# Patient Record
(demographics unavailable — no encounter records)

---

## 2025-02-26 NOTE — ASSESSMENT
[FreeTextEntry1] : On physical exam there is no significant increased tympany or tenderness.  I recommended that she continue lubiprostone 24 mcg p.o. twice daily and that she add MiraLAX 17 g in 8 ounces of water to be taken every other day.  It was recommended that she adhere to the low FODMAP diet daily.  In the meantime she will obtain a CBC, comprehensive metabolic profile, celiac antibodies as well as magnesium and B12 levels.  She will undergo a CAT scan of the abdomen and pelvis with the prednisone prep for further evaluation.  Current recommendations will depend upon the results of the above.

## 2025-02-26 NOTE — REASON FOR VISIT
[Follow-up] : a follow-up of an existing diagnosis [FreeTextEntry1] : Chronic constipation, gastroesophageal reflux without esophagitis, chronic abdominal bloating and prior adenomatous colon polyp

## 2025-02-26 NOTE — PHYSICAL EXAM
[Alert] : alert [Normal Voice/Communication] : normal voice/communication [Healthy Appearing] : healthy appearing [No Acute Distress] : no acute distress [Obese (BMI >= 30)] : obese (BMI >= 30) [Sclera] : the sclera and conjunctiva were normal [Hearing Threshold Finger Rub Not Darke] : hearing was normal [Normal Lips/Gums] : the lips and gums were normal [Oropharynx] : the oropharynx was normal [Normal Appearance] : the appearance of the neck was normal [No Respiratory Distress] : no respiratory distress [No Acc Muscle Use] : no accessory muscle use [Respiration, Rhythm And Depth] : normal respiratory rhythm and effort [Heart Rate And Rhythm] : heart rate was normal and rhythm regular [Bowel Sounds] : normal bowel sounds [Abdomen Tenderness] : non-tender [No Masses] : no abdominal mass palpated [Abdomen Soft] : soft [] : no hepatosplenomegaly [Oriented To Time, Place, And Person] : oriented to person, place, and time

## 2025-02-26 NOTE — HISTORY OF PRESENT ILLNESS
[FreeTextEntry1] : The patient has been previously followed by Dr. Camejo who performed upper endoscopies in 2012 and 2015 for evaluation of atypical chest pain and both were unremarkable. He also performed a colonoscopy in 2009 which was normal other than the presence of hemorrhoids. She has not been seen in our office since that time. She has been followed most recently by Dr. Basilio. She apparently has relatively severe chronic constipation with a great deal of gas, bloating and distention that radiates up into her chest as well as occasional heartburn. July 2023 she underwent a follow-up colonoscopy by him that revealed diverticuli in the sigmoid colon and a 5 mm polyp in the transverse colon that was removed by snare polypectomy and was a tubular adenoma. There were no other abnormalities. That same day he performed an upper endoscopy which was normal other than the presence of fundic gland polyps and a pyloric channel ulcer. Biopsies for celiac disease were negative and there were no H. pylori organisms. She has been taking pantoprazole 40 mg p.o. every morning ever since and only has occasional heartburn with no significant upper abdominal pain other than the bloating.  When initially seen by me in October of last year she was on no medication or supplements for the chronic constipation and was experiencing a bowel movement about once weekly for which she will take an enema.  A CAT scan of the abdomen and pelvis in March of last year revealed the absence of the gallbladder but was otherwise normal with the exception of severe atherosclerotic calcification of the coronary arteries and some perinephric soft tissue stranding due to previous inflammation or infection of her kidneys.  There was a 10 mm renal abnormality for which she underwent an abdominal sonogram.  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  There was also a subcentimeter echogenic lesion in each kidney for which a 6-month follow-up was recommended.  I ordered thyroid function test and a C-reactive protein all of which were normal.  She was also placed on a low FODMAP diet as well as lubiprostone 24 mcg p.o. twice daily.  She returns today for routine follow-up.  With the use of lubiprostone twice daily she is having a bowel movement every 3 to 4 days rather than once daily but for the past several days has been experiencing rather diffuse abdominal pain and increased bloating but there is no nausea or vomiting.  There is no rectal bleeding.  She does not regularly adhere to the low FODMAP diet.

## 2025-04-10 NOTE — HISTORY OF PRESENT ILLNESS
[FreeTextEntry1] : Patient with history of CAD, CM, CHF, hypertension. Patient underwent coronary angiogram in 2014 and noted to have patent stents.  Denies any exertional chest pain, shortness of breath or palpitations. Is able to do her ADLs.   9/2018- Had shortness of breath and chest pain. Underwent cardiac cath at [facility].  12/10/20 She presents today after c/o left sided chest pressure/tightness for 48 hours. Able to localize pressure over left chest, and also feels in back. Does not worsen with exertion, mild in nature. Denies nausea, vomiting, sob, fatigue, BERG. Not tender to palpation. Had NST 8/31/20 , no clear evidence of ischemia or infarct. EF 42%. Cath in 2018 revealed patent stents with 0% stenosis.Has GERD, has not been taking prilosec or gas-x.    4/2021- Stable. GERD symptoms. Had COVID in december.   7/19/2022 Patient here for follow up. [facility] She presented to ED with c/o headaches and double vision. MRI and CT scan of brain showed no acute findings. MRA neck showed 50-69% stenosis of right carotid bulb and hypoplasia of cervical right vertebral artery. She was also told she has weakened heart function (EF in the 30s) and leaky valve. She was started on coreg and entresto. Echo results not available (will [facility] from [facility]). She c/o fatigue and dyspnea with exertion x 6 months. Denies chest pain, palpitations, near syncope or syncope. Denies edema or orthopnea, sleeps with 2 pillows which is her baseline. She uses CPAP at night for ZACK. Does have acid reflux symptoms for years and relieved by gasX.   8/30/2022 Patient here for follow up. She is s/p [facility] which showed moderate RCA in-stent restenosis and patent LAD and LCX stents, normal LVEDP- no PCI needed. RFA site benign. TTE done on 8/24/22 showed LVEF 50-55%, trace MR and moderate AI (LVEF 40% with severe MR noted on [facility]TE in [facility] She denies chest pain, SOB at rest, palpitations, near syncope or syncope. She has fatigue and exertional dyspnea, unchanged. No edema or orthopnea. She reports she had covid in July.   7/11/2023 Patient returns for preprocedural cardiac risk assessment for upcoming upper endoscopy and colonoscopy to be performed by Dr. Karen Basilio on July 19. Denies chest pain, shortness of breath, palpitations, syncope or near syncope, orthopnea and PND. Compliant with medications. No leg edema.  2/2024- Underwent stress/echo/carotid US. Has had left sided chest pain with radiation to back pain, no N/v/d. Has had GERD.   6/3/2024-Pt was at [facility] yesterday 6/2 due to CP and an episode of SOB started on 5/27/24. CXR negative, HST negative x 3, pBNP 265, LE v doppler negative for DVT. Pt reports left sided chest pressure that comes and goes, with radiation to the left scapula started 5/27/2024, felt multiple times per day lasting 10-15 minutes at a time. In the past she has felt complete relief with rubbing the area, belching and taking GasX, now only partial relief. pain is not reproducible to palpitation. Reports 1 episode of SOB while doing extensive yard work last Wednesday no CP at that time. Compliant with meds. She has had some Left LE edema which resolves with leg elevation.  She has a lot of stress at home with her 15y/o grandson.    6/2024- Didn't get cath done. TTE. Endoscopy with gastric ulcer. Now on meds.   8/2024- No Chest pain symptoms. Underoing GI procedures.   4/4/2025 Returns for follow up office visit. Reports of feeling good. Gets dizzy at times when standing up fast. Appears euvolemic. Denies chest pain, shortness of breath, palpitations, syncope. Compliant with medications. No leg edema. Uses CPAP device regularly.

## 2025-04-10 NOTE — DISCUSSION/SUMMARY
[Patient] : the patient [Risks] : risks [Benefits] : benefits [Alternatives] : alternatives [FreeTextEntry1] : Dr. Ribeiro was present in the office during the visit  1. CAD- Patent stents on LHC done 8/24/22. Denies CP/SOB. NST last 1/2024 with normal myocardial perfusion scan, with no evidence of infarction or inducible ischemia. Echo last 6/2024 with EF 50-55%, G1DD, mild AI, mild TR. On aspirin 81 mg QD, atorvastatin 20 mg Q HS, Imdur 30 mg ER QD and BB 2. CM/CHF- appears euvolemic. TTE 6/2024 LVEF 50-55% continue Coreg, Entresto, and spironolactone,  3. Hypertension- stable, controlled 4. HLD- on Atorvastatin 20 mg QD, continue. Will request fasting lipid panel 5. DM- follows with PMD 6. Moderate AI- on TTE last 8/2022. Mild AI on echo last 6/2024 7. f/u with Dr. Ribeiro in 6 months or sooner if needed   Patient was advised to contact the office or seek emergency medical care for any new, worsening or concerning symptoms. Patient verbalized understanding and is in agreement with the above plan.  Anthony Magallanes, NP [EKG obtained to assist in diagnosis and management of assessed problem(s)] : EKG obtained to assist in diagnosis and management of assessed problem(s)

## 2025-04-10 NOTE — CARDIOLOGY SUMMARY
[de-identified] : 4/4/2025: Sinus Rhythm  -Left bundle branch block. 6/3/2024 Sinus 1degree AVB with LBBB

## 2025-04-10 NOTE — CARDIOLOGY SUMMARY
[de-identified] : 4/4/2025: Sinus Rhythm  -Left bundle branch block. 6/3/2024 Sinus 1degree AVB with LBBB

## 2025-04-10 NOTE — REVIEW OF SYSTEMS
[SOB] : no shortness of breath [Lower Ext Edema] : no extremity edema [Chest Discomfort] : no chest discomfort [Palpitations] : no palpitations [Syncope] : no syncope

## 2025-04-10 NOTE — HISTORY OF PRESENT ILLNESS
[FreeTextEntry1] : Patient with history of CAD, CM, CHF, hypertension. Patient underwent coronary angiogram in 2014 and noted to have patent stents.  Denies any exertional chest pain, shortness of breath or palpitations. Is able to do her ADLs.   9/2018- Had shortness of breath and chest pain. Underwent cardiac cath at [facility].  12/10/20 She presents today after c/o left sided chest pressure/tightness for 48 hours. Able to localize pressure over left chest, and also feels in back. Does not worsen with exertion, mild in nature. Denies nausea, vomiting, sob, fatigue, BERG. Not tender to palpation. Had NST 8/31/20 , no clear evidence of ischemia or infarct. EF 42%. Cath in 2018 revealed patent stents with 0% stenosis.Has GERD, has not been taking prilosec or gas-x.    4/2021- Stable. GERD symptoms. Had COVID in december.   7/19/2022 Patient here for follow up. She was admitted [facility] last week. She presented to ED with c/o headaches and double vision. MRI and CT scan of brain showed no acute findings. MRA neck showed 50-69% stenosis of right carotid bulb and hypoplasia of cervical right vertebral artery. She was also told she has weakened heart function (EF in the 30s) and leaky valve. She was started on coreg and entresto. Echo results not available (will request from [facility]). She c/o fatigue and dyspnea with exertion x 6 months. Denies chest pain, palpitations, near syncope or syncope. Denies edema or orthopnea, sleeps with 2 pillows which is her baseline. She uses CPAP at night for ZACK. Does have acid reflux symptoms for years and relieved by gasX.   8/30/2022 Patient here for follow up. She is s/p [facility] which showed moderate RCA in-stent restenosis and patent LAD and LCX stents, normal LVEDP- no PCI needed. RFA site benign. TTE done on 8/24/22 showed LVEF 50-55%, trace MR and moderate AI (LVEF 40% with severe MR noted on TTE in 7/2022 at [facility]). She denies chest pain, SOB at rest, palpitations, near syncope or syncope. She has fatigue and exertional dyspnea, unchanged. No edema or orthopnea. She reports she had covid in July.   7/11/2023 Patient returns for preprocedural cardiac risk assessment for upcoming upper endoscopy and colonoscopy to be performed by Dr. Karen Basilio on July 19. Denies chest pain, shortness of breath, palpitations, syncope or near syncope, orthopnea and PND. Compliant with medications. No leg edema.  2/2024- Underwent stress/echo/carotid US. Has had left sided chest pain with radiation to back pain, no N/v/d. Has had GERD.   6/3/2024-Pt was at [facility] yesterday 6/2 due to CP and an episode of SOB started on 5/27/24. CXR negative, HST negative x 3, pBNP 265, LE v doppler negative for DVT. Pt reports left sided chest pressure that comes and goes, with radiation to the left scapula started 5/27/2024, felt multiple times per day lasting 10-15 minutes at a time. In the past she has felt complete relief with rubbing the area, belching and taking GasX, now only partial relief. pain is not reproducible to palpitation. Reports 1 episode of SOB while doing extensive yard work last Wednesday no CP at that time. Compliant with meds. She has had some Left LE edema which resolves with leg elevation.  She has a lot of stress at home with her 13y/o grandson.    6/2024- Didn't get cath done. TTE. Endoscopy with gastric ulcer. Now on meds.   8/2024- No Chest pain symptoms. Underoing GI procedures.   4/4/2025 Returns for follow up office visit. Reports of feeling good. Gets dizzy at times when standing up fast. Appears euvolemic. Denies chest pain, shortness of breath, palpitations, syncope. Compliant with medications. No leg edema. Uses CPAP device regularly.

## 2025-04-10 NOTE — DISCUSSION/SUMMARY
[Patient] : the patient [Risks] : risks [Alternatives] : alternatives [Benefits] : benefits [EKG obtained to assist in diagnosis and management of assessed problem(s)] : EKG obtained to assist in diagnosis and management of assessed problem(s) [FreeTextEntry1] : Dr. Ribeiro was present in the office during the visit  1. CAD- Patent stents on LHC done 8/24/22. Denies CP/SOB. NST last 1/2024 with normal myocardial perfusion scan, with no evidence of infarction or inducible ischemia. Echo last 6/2024 with EF 50-55%, G1DD, mild AI, mild TR. On aspirin 81 mg QD, atorvastatin 20 mg Q HS, Imdur 30 mg ER QD and BB 2. CM/CHF- appears euvolemic. TTE 6/2024 LVEF 50-55% continue Coreg, Entresto, and spironolactone,  3. Hypertension- stable, controlled 4. HLD- on Atorvastatin 20 mg QD, continue. Will request fasting lipid panel 5. DM- follows with PMD 6. Moderate AI- on TTE last 8/2022. Mild AI on echo last 6/2024 7. f/u with Dr. Ribeiro in 6 months or sooner if needed   Patient was advised to contact the office or seek emergency medical care for any new, worsening or concerning symptoms. Patient verbalized understanding and is in agreement with the above plan.  Anthony Magallanes, NP

## 2025-05-01 NOTE — ASSESSMENT
[FreeTextEntry1] : Seizure disorder as discussed above No seizures since increasing the Keppra to 1000 mg twice a day, maintaining Tegretol 400 mg twice a day Medications have been refilled.  Follow-up here in 1 year and by phone prior to that as needed.

## 2025-05-01 NOTE — PHYSICAL EXAM
[General Appearance - Alert] : alert [General Appearance - In No Acute Distress] : in no acute distress [General Appearance - Well-Appearing] : healthy appearing [Oriented To Time, Place, And Person] : oriented to person, place, and time [Impaired Insight] : insight and judgment were intact [Affect] : the affect was normal [Memory Recent] : recent memory was not impaired [Person] : oriented to person [Place] : oriented to place [Time] : oriented to time [Concentration Intact] : normal concentrating ability [Fluency] : fluency intact [Comprehension] : comprehension intact [Reading] : reading intact [Past History] : adequate knowledge of personal past history [Cranial Nerves Optic (II)] : visual acuity intact bilaterally,  visual fields full to confrontation, pupils equal round and reactive to light [Cranial Nerves Oculomotor (III)] : extraocular motion intact [Cranial Nerves Trigeminal (V)] : facial sensation intact symmetrically [Cranial Nerves Facial (VII)] : face symmetrical [Cranial Nerves Vestibulocochlear (VIII)] : hearing was intact bilaterally [Cranial Nerves Glossopharyngeal (IX)] : tongue and palate midline [Cranial Nerves Accessory (XI - Cranial And Spinal)] : head turning and shoulder shrug symmetric [Cranial Nerves Hypoglossal (XII)] : there was no tongue deviation with protrusion [Motor Tone] : muscle tone was normal in all four extremities [Motor Strength] : muscle strength was normal in all four extremities [No Muscle Atrophy] : normal bulk in all four extremities [Paresis Pronator Drift Right-Sided] : no pronator drift on the right [Paresis Pronator Drift Left-Sided] : no pronator drift on the left [Sensation Tactile Decrease] : light touch was intact [Sensation Pain / Temperature Decrease] : pain and temperature was intact [Romberg's Sign] : Romberg's sign was negtive [Abnormal Walk] : normal gait [Balance] : balance was intact [Past-pointing] : there was no past-pointing [Tremor] : no tremor present [Coordination - Dysmetria Impaired Finger-to-Nose Bilateral] : not present [Coordination - Dysmetria Impaired Heel-to-Shin Bilateral] : not present [2+] : Ankle jerk left 2+ [Plantar Reflex Right Only] : normal on the right [Plantar Reflex Left Only] : normal on the left [PERRL With Normal Accommodation] : pupils were equal in size, round, reactive to light, with normal accommodation [Extraocular Movements] : extraocular movements were intact [Full Visual Field] : full visual field

## 2025-05-01 NOTE — HISTORY OF PRESENT ILLNESS
[FreeTextEntry1] : I saw her initially 2/7/2024 with the following history. This 67-year-old woman was seen in neurological consultation today.  She carries a diagnosis of epilepsy since her early 20s She was initially worked up at [facility].  She was told she had focal seizures Seizures typically consist of her being disoriented for few minutes and her right arm dropping.  There is no loss of consciousness.  Occasionally she will moan At 1 point seizures were very frequent.  Now she is having 3 to 4/year last 2 months ago.   says this is the best they have ever been. Currently on Keppra 750 mg twice a day and Tegretol 200 mg pills 2 pills twice a day She has not seen a neurologist in about a year and a half and has not had any recent levels checked She does not drink alcohol and she does not drive She says she has had multiple EEG studies including EEG monitoring studies and brain MRIs in the past No prior records available  Medical history otherwise significant for hyperlipidemia, diabetes, coronary artery disease, congestive heart failure.  She returned 8/9/2024 for follow-up Seizure pattern has remained stable EEG that we had done was unremarkable Her Tegretol level was 11.1 and Keppra level was 33.8 (12-46). She does report that she gets a bad migraine after a seizure and over-the-counter medication does not work and she is asking for something for the migraines.  5/1/2025: At her last visit I did increase the Keppra to 1000 mg twice a day while maintaining the carbamazepine at 400 mg twice a day She has not had a seizure since I had also prescribed Ubrelvy for her migraines but since she has not had a seizure she has not gotten a migraine either.

## 2025-05-08 NOTE — ASSESSMENT
[FreeTextEntry1] : At this time I have simply recommended that she continue with the same medical regimen and that she also adhere to a low FODMAP diet.  She will follow-up in 6 months.

## 2025-05-08 NOTE — PHYSICAL EXAM
[Alert] : alert [Normal Voice/Communication] : normal voice/communication [Healthy Appearing] : healthy appearing [No Acute Distress] : no acute distress [Obese (BMI >= 30)] : obese (BMI >= 30) [Sclera] : the sclera and conjunctiva were normal [Hearing Threshold Finger Rub Not Kern] : hearing was normal [Normal Lips/Gums] : the lips and gums were normal [Oropharynx] : the oropharynx was normal [Normal Appearance] : the appearance of the neck was normal [No Respiratory Distress] : no respiratory distress [No Acc Muscle Use] : no accessory muscle use [Respiration, Rhythm And Depth] : normal respiratory rhythm and effort [Heart Rate And Rhythm] : heart rate was normal and rhythm regular [Bowel Sounds] : normal bowel sounds [Abdomen Tenderness] : non-tender [No Masses] : no abdominal mass palpated [Abdomen Soft] : soft [] : no hepatosplenomegaly [Oriented To Time, Place, And Person] : oriented to person, place, and time

## 2025-05-08 NOTE — HISTORY OF PRESENT ILLNESS
[FreeTextEntry1] : The patient has been previously followed by Dr. Camejo who performed upper endoscopies in 2012 and 2015 for evaluation of atypical chest pain and both were unremarkable.  He also performed a colonoscopy in 2009 which was normal other than the presence of hemorrhoids.  He has a history of relatively severe chronic constipation with a great deal of gas and bloating as well as abdominal distention that radiates up into her chest as well as occasional heartburn.  In July 2023 she underwent a follow-up colonoscopy and upper endoscopy by Dr. Basilio.  There were diverticuli in the sigmoid colon and a 5 mm polyp in the transverse colon that was removed by snare polypectomy was a tubular adenoma.  There were no other abnormalities.  The upper endoscopy was normal other than the presence of fundic land polyps and a pyloric channel ulcer.  Biopsies for celiac disease were negative and there were no H. pylori organisms.  She has been taking pantoprazole 40 mg p.o. every morning ever since and only has occasional heartburn and no significant abdominal pain other than the bloating.  She was seen by me in October 2020 for at which time she was on no medications or supplementations for the chronic constipation and was experiencing a bowel movement about once weekly for which she will take an enema.  A CAT scan of the abdomen and pelvis in March of last year revealed the absence of the gallbladder but was otherwise normal with the exception of severe atherosclerotic calcification of the coronary arteries and some perinephric soft tissue stranding due to previous inflammation or infection of her kidneys.  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  Thyroid function test and C-reactive protein were normal.  She was placed on a low FODMAP diet as well as lubiprostone 24 mcg twice daily.  When last seen several months ago she was taking the lubiprostone was having a bowel movement every 3 to 4 days rather than once weekly.  He was still having some rather diffuse abdominal pain and increased bloating but no nausea or vomiting.  He was not regularly adhering to a low FODMAP diet.  At that time I recommended that she add MiraLAX every other evening to the medical regimen and that she undergo a CBC and comprehensive metabolic profile as well as celiac antibodies, magnesium and B12 levels all of which were normal.  She underwent a CAT scan of the abdomen and pelvis once again using a prednisone prep which was again normal other than a small fat-containing umbilical hernia.  She returns today for follow-up.  She continues to take lubiprostone 24 mcg p.o. twice daily and is now taking MiraLAX every other evening.  On this regimen she is having a normal bowel movement every 2 or 3 days.  The bloating is somewhat less but still present.  There is no significant abdominal pain.  She continues to take pantoprazole 40 mg p.o. every morning and famotidine 40 mg every evening and denies any significant heartburn or dysphagia.  She is feeling much better overall.